# Patient Record
Sex: FEMALE | Race: WHITE
[De-identification: names, ages, dates, MRNs, and addresses within clinical notes are randomized per-mention and may not be internally consistent; named-entity substitution may affect disease eponyms.]

---

## 2018-02-23 ENCOUNTER — HOSPITAL ENCOUNTER (OUTPATIENT)
Dept: HOSPITAL 53 - M RAD | Age: 28
End: 2018-02-23
Attending: ADVANCED PRACTICE MIDWIFE
Payer: COMMERCIAL

## 2018-02-23 DIAGNOSIS — Z34.82: Primary | ICD-10-CM

## 2018-02-23 DIAGNOSIS — Z3A.21: ICD-10-CM

## 2018-02-23 PROCEDURE — 76811 OB US DETAILED SNGL FETUS: CPT

## 2019-03-01 ENCOUNTER — HOSPITAL ENCOUNTER (OUTPATIENT)
Dept: HOSPITAL 53 - M OPP | Age: 29
Discharge: HOME | End: 2019-03-01
Attending: INTERNAL MEDICINE
Payer: COMMERCIAL

## 2019-03-01 VITALS — WEIGHT: 95 LBS | HEIGHT: 64 IN | BODY MASS INDEX: 16.22 KG/M2

## 2019-03-01 VITALS — DIASTOLIC BLOOD PRESSURE: 55 MMHG | SYSTOLIC BLOOD PRESSURE: 106 MMHG

## 2019-03-01 DIAGNOSIS — K90.0: ICD-10-CM

## 2019-03-01 DIAGNOSIS — Z91.011: ICD-10-CM

## 2019-03-01 DIAGNOSIS — K64.8: Primary | ICD-10-CM

## 2019-03-01 DIAGNOSIS — K21.0: ICD-10-CM

## 2019-03-01 DIAGNOSIS — K22.8: ICD-10-CM

## 2019-03-01 DIAGNOSIS — Z91.018: ICD-10-CM

## 2019-03-01 DIAGNOSIS — K29.70: ICD-10-CM

## 2019-03-01 DIAGNOSIS — K51.90: ICD-10-CM

## 2019-03-01 PROCEDURE — 43239 EGD BIOPSY SINGLE/MULTIPLE: CPT

## 2019-03-01 PROCEDURE — 88305 TISSUE EXAM BY PATHOLOGIST: CPT

## 2019-03-01 PROCEDURE — 45380 COLONOSCOPY AND BIOPSY: CPT

## 2019-03-01 NOTE — ROOR
________________________________________________________________________________

Patient Name: Martha Batres             Procedure Date: 3/1/2019 11:57 AM

MRN: [id number]                          Account Number: [id number]

YOB: 1990                Age: 28

Room: [facility]                            Gender: Female

Note Status: Finalized                 

________________________________________________________________________________

 

Procedure:           Upper GI endoscopy

Indications:         Suspected celiac disease, Follow-up of celiac disease

Providers:           Glynn Mckinnon MD

Referring MD:        BRUNO XIE MD

Requesting Provider: 

Medicines:           Monitored Anesthesia Care

Complications:       No immediate complications.

________________________________________________________________________________

Procedure:           Pre-Anesthesia Assessment:

                     - Prior to the procedure, a History and Physical was 

                     performed, and patient medications and allergies were 

                     reviewed. The patient is competent. The risks and 

                     benefits of the procedure and the sedation options and 

                     risks were discussed with the patient. All questions were 

                     answered and informed consent was obtained. Patient 

                     identification and proposed procedure were verified by 

                     the physician, the nurse and the anesthesiologist in the 

                     procedure room. Mental Status Examination: alert and 

                     oriented. Airway Examination: normal oropharyngeal airway 

                     and neck mobility. Respiratory Examination: clear to 

                     auscultation. CV Examination: normal. Prophylactic 

                     Antibiotics: The patient does not require prophylactic 

                     antibiotics. Prior Anticoagulants: The patient has taken 

                     no previous anticoagulant or antiplatelet agents. ASA 

                     Grade Assessment: II - A patient with mild systemic 

                     disease. After reviewing the risks and benefits, the 

                     patient was deemed in satisfactory condition to undergo 

                     the procedure. The anesthesia plan was to use monitored 

                     anesthesia care (MAC). Immediately prior to 

                     administration of medications, the patient was 

                     re-assessed for adequacy to receive sedatives. The heart 

                     rate, respiratory rate, oxygen saturations, blood 

                     pressure, adequacy of pulmonary ventilation, and response 

                     to care were monitored throughout the procedure. The 

                     physical status of the patient was re-assessed after the 

                     procedure.

                     The Endoscope was introduced through the mouth, and 

                     advanced to the second part of duodenum. The upper GI 

                     endoscopy was accomplished without difficulty. The 

                     patient tolerated the procedure well.

                                                                                

Findings:

     LA Grade A (one or more mucosal breaks less than 5 mm, not extending 

     between tops of 2 mucosal folds) esophagitis with no bleeding was found 

     40 cm from the incisors. Biopsies were taken with a cold forceps for 

     histology. Verification of patient identification for the specimen was 

     done by the physician and nurse using the patient's name, birth date and 

     medical record number. Estimated blood loss was minimal.

     The Z-line was irregular and was found 40 cm from the incisors.

     Patchy mild inflammation characterized by erythema and granularity was 

     found in the gastric body and in the gastric antrum. Biopsies were taken 

     with a cold forceps for Helicobacter pylori testing.

     The duodenal bulb, second portion of the duodenum, third portion of the 

     duodenum and fourth portion of the duodenum were normal. Biopsies for 

     histology were taken with a cold forceps for evaluation of celiac disease.

                                                                                

Impression:          - LA Grade A reflux esophagitis. Biopsied.

                     - Z-line irregular, 40 cm from the incisors.

                     - Gastritis. Biopsied.

                     - Normal duodenal bulb, second portion of the duodenum, 

                     third portion of the duodenum and fourth portion of the 

                     duodenum. Biopsied.

Recommendation:      - Patient has a contact number available for emergencies. 

                     The signs and symptoms of potential delayed complications 

                     were discussed with the patient. Return to normal 

                     activities tomorrow. Written discharge instructions were 

                     provided to the patient.

                     - Resume previous diet.

                     - Continue present medications.

                     - Await pathology results.

                     - Based on the biopsy results you will receive a phone 

                     call from GI clinic in 2-3 weeks to review the pathology 

                     results AND/OR your results will be faxed to your Primary 

                     care physician.

                     - Return to primary care physician.

                                                                                

 

Glynn Mckinnon MD

_______________________

Glynn Mckinnon MD

3/1/2019 12:51:35 PM

This report has been signed electronically.

Number of Addenda: 0

 

Note Initiated On: 3/1/2019 11:57 AM

Estimated Blood Loss:

     Estimated blood loss was minimal.

## 2019-03-01 NOTE — ROOR
________________________________________________________________________________

Patient Name: Martha Batres             Procedure Date: 3/1/2019 11:58 AM

MRN: [id number]                          Account Number: [id number]

YOB: 1990                Age: 28

Room: [facility]                            Gender: Female

Note Status: Finalized                 

________________________________________________________________________________

 

Procedure:           Colonoscopy

Indications:         Follow-up of ulcerative colitis

Providers:           Glynn Mckinnon MD

Referring MD:        BRUNO XIE MD

Requesting Provider: 

Medicines:           Monitored Anesthesia Care

Complications:       No immediate complications.

________________________________________________________________________________

Procedure:           Pre-Anesthesia Assessment:

                     - Prior to the procedure, a History and Physical was 

                     performed, and patient medications and allergies were 

                     reviewed. The patient is competent. The risks and 

                     benefits of the procedure and the sedation options and 

                     risks were discussed with the patient. All questions were 

                     answered and informed consent was obtained. Patient 

                     identification and proposed procedure were verified by 

                     the physician, the nurse and the anesthesiologist in the 

                     procedure room. Mental Status Examination: alert and 

                     oriented. Airway Examination: normal oropharyngeal airway 

                     and neck mobility. Respiratory Examination: clear to 

                     auscultation. CV Examination: normal. Prophylactic 

                     Antibiotics: The patient does not require prophylactic 

                     antibiotics. Prior Anticoagulants: The patient has taken 

                     no previous anticoagulant or antiplatelet agents. ASA 

                     Grade Assessment: II - A patient with mild systemic 

                     disease. After reviewing the risks and benefits, the 

                     patient was deemed in satisfactory condition to undergo 

                     the procedure. The anesthesia plan was to use monitored 

                     anesthesia care (MAC). Immediately prior to 

                     administration of medications, the patient was 

                     re-assessed for adequacy to receive sedatives. The heart 

                     rate, respiratory rate, oxygen saturations, blood 

                     pressure, adequacy of pulmonary ventilation, and response 

                     to care were monitored throughout the procedure. The 

                     physical status of the patient was re-assessed after the 

                     procedure.

                     The Colonoscope was introduced through the anus and 

                     advanced to the terminal ileum, with identification of 

                     the appendiceal orifice and IC valve. The colonoscopy was 

                     performed without difficulty. The patient tolerated the 

                     procedure well. The quality of the bowel preparation was 

                     good. The terminal ileum, ileocecal valve, appendiceal 

                     orifice, and rectum were photographed. Scope insertion 

                     time was 3 minutes. Scope withdrawal time was 9 minutes. 

                     The total duration of the procedure was 12 minutes.

                                                                                

Findings:

     The perianal and digital rectal examinations were normal.

     The terminal ileum appeared normal.

     Normal mucosa was found in the entire colon. Biopsies for histology were 

     taken with a cold forceps from the right colon, left colon, sigmoid colon 

     and rectum for evaluation of microscopic colitis. Verification of patient 

     identification for the specimen was done by the physician and nurse using 

     the patient's name, birth date and medical record number. Estimated blood 

     loss was minimal.

     Non-bleeding external and internal hemorrhoids were found during 

     retroflexion. The hemorrhoids were medium-sized.

                                                                                

Impression:          - The examined portion of the ileum was normal.

                     - Normal mucosa in the entire examined colon. Biopsied.

                     - Non-bleeding external and internal hemorrhoids.

Recommendation:      - Patient has a contact number available for emergencies. 

                     The signs and symptoms of potential delayed complications 

                     were discussed with the patient. Return to normal 

                     activities tomorrow. Written discharge instructions were 

                     provided to the patient.

                     - Resume previous diet.

                     - Continue present medications.

                     - Await pathology results.

                     - Repeat colonoscopy at age 49 for screening purposes.

                     - Return to GI clinic in [facility] 

                     (address [address], [address], [place] [address]) 

                     in 4 -- 6 weeks. Please call GI clinic @ [phone] for 

                     apppointment date and time.

                     - Return to primary care physician.

                                                                                

 

Glynn Mckinnon MD

_______________________

Glynn Mckinnon MD

3/1/2019 1:27:09 PM

This report has been signed electronically.

Number of Addenda: 0

 

Note Initiated On: 3/1/2019 11:58 AM

Estimated Blood Loss:

     Estimated blood loss was minimal.

## 2019-05-22 ENCOUNTER — HOSPITAL ENCOUNTER (OUTPATIENT)
Dept: HOSPITAL 53 - M RAD | Age: 29
End: 2019-05-22
Attending: ADVANCED PRACTICE MIDWIFE
Payer: COMMERCIAL

## 2019-05-22 DIAGNOSIS — N64.4: Primary | ICD-10-CM

## 2019-05-22 NOTE — REP
ULTRASOUND RIGHT BREAST:

 

Real-time sonographic evaluation of the right breast performed for small palpable

abnormality and pain between 10 and 11 o'clock.  Ultrasound of that area shows

dense fibroglandular tissue without a discrete cystic or solid nodule.

 

IMPRESSION:

 

ACR 2 benign.  Dense fibroglandular tissue seen in the 9 to 11 o'clock region of

the right breast.  No discrete cystic or solid nodule.  A negative ultrasound

should not deter biopsy if there is a clinically suspicious palpable mass

present.

 

 

Electronically Signed by

William Shea MD 05/23/2019 12:26 P

## 2019-08-07 ENCOUNTER — HOSPITAL ENCOUNTER (OUTPATIENT)
Dept: HOSPITAL 53 - M SMT | Age: 29
End: 2019-08-07
Attending: ADVANCED PRACTICE MIDWIFE
Payer: COMMERCIAL

## 2019-08-07 DIAGNOSIS — N92.6: Primary | ICD-10-CM

## 2019-08-07 LAB — B-HCG SERPL QL: NEGATIVE

## 2019-08-12 ENCOUNTER — HOSPITAL ENCOUNTER (OUTPATIENT)
Dept: HOSPITAL 53 - M RAD | Age: 29
End: 2019-08-12
Attending: ADVANCED PRACTICE MIDWIFE
Payer: COMMERCIAL

## 2019-08-12 DIAGNOSIS — N83.202: Primary | ICD-10-CM

## 2019-08-12 NOTE — REP
REASON:  Metrorrhagia.

 

PRIORS:  None.

 

Transvesical and transvaginal imaging was obtained.

 

The uterus measures 9.1 x 5 x 6.3 cm. The parenchymal echo pattern is within

normal limits.  The endometrial echo complex is unremarkable appearing measuring

4 mm in its greatest thickness.

 

Right ovary measures 2.7 x 2.2 x 2.8 cm and is within normal limits with an RI of

0.36.  Left ovary measures 5.8 x 3.3 x 4.1 cm.  Within the substance of the left

ovary there is a 2.7 x 2.4 x 2.6 cm sized anechoic structure which exhibits

posterior wall enhancement and increased through transmission. This is without

papillary projections or septations and is consistent with a simple cyst.  The

left ovarian RI is 0.51.

 

Urinary bladder measures 11 x 9 x 11 cm.

 

IMPRESSION:

 

Simple left ovarian cyst as described above.

 

 

Electronically Signed by

Judd Garber DO 08/12/2019 04:48 P

## 2019-08-23 ENCOUNTER — HOSPITAL ENCOUNTER (OUTPATIENT)
Dept: HOSPITAL 53 - M WHC | Age: 29
End: 2019-08-23
Attending: FAMILY MEDICINE
Payer: COMMERCIAL

## 2019-08-23 DIAGNOSIS — M85.80: Primary | ICD-10-CM

## 2019-08-28 NOTE — DEXA
AP SPINE   L1 - L4   0.987   -1.7      -1.7

LT FEMUR   TOTAL   0.777   -1.8      -1.8

LT NECK      0.806   -1.7      -1.5

RT FEMUR   TOTAL   0.783   -1.8      -1.7      

RT NECK      0.761   -2.0      -1.8

TOTAL BODY   TOTAL

OTHER



COMMENTS:

There is low bone density of the spine and hips.

The density of the spine has decreased 6.0% since 02/25/2015.

The density of the left hip has decreased 2.9% since 02/27/2015.

The density of the right hip has decreased 0.6% since 02/27/2015.

The decreased density of the spine does represent a significant change.

The decreased density of the left hip does represent a significant change.

The decreased density of the right hip does not represent a significant change.



FOLLOW-UP:

Recommendation for the next bone density exam: 2 years.



RICARDO

## 2019-11-26 ENCOUNTER — HOSPITAL ENCOUNTER (OUTPATIENT)
Dept: HOSPITAL 53 - M RAD | Age: 29
End: 2019-11-26
Attending: OTOLARYNGOLOGY
Payer: COMMERCIAL

## 2019-11-26 DIAGNOSIS — J01.01: Primary | ICD-10-CM

## 2019-11-26 DIAGNOSIS — J34.89: ICD-10-CM

## 2019-11-27 NOTE — REP
MAXILLOFACIAL CT STUDY WITHOUT CONTRAST:

 

HISTORY:  Sinusitis.

 

No comparison imaging.

 

FINDINGS:  Frontal sinuses are clear.  There is moderate opacification of the

anterior mid ethmoid air cells on the right.  Minimal mucosal thickening is seen

in the ethmoid air cells on the left.  Sphenoid sinus is clear.  Mastoid aeration

is normal and symmetric.  There is minimal mucosal thickening affecting the left

maxillary sinus with the small mucous retention cyst along its superior wall.

There is moderate mucosal thickening surrounding the right maxillary sinus.  The

ostiomeatal complex on the right is obscured and filled by mucosal thickening.

On the left the OMC is patent.  There are Autumn cells bilaterally.  These are

small.

 

The bony nasal septum is in the midline.  Nasal turbinate soft tissues are

unremarkable. No intraorbital or intracranial abnormality is seen.

 

IMPRESSION:

 

Polysinusitis changes.  The right OMC is occluded by mucosal thickening.

Bilateral Autumn cells are seen.  The right maxillary and right ethmoid air cells

are most prominently affected.

 

 

Electronically Signed by

Dustin Puente MD 11/27/2019 11:17 A

## 2020-01-09 ENCOUNTER — HOSPITAL ENCOUNTER (OUTPATIENT)
Dept: HOSPITAL 53 - M WHC | Age: 30
End: 2020-01-09
Attending: ADVANCED PRACTICE MIDWIFE
Payer: COMMERCIAL

## 2020-01-09 DIAGNOSIS — R92.8: Primary | ICD-10-CM

## 2020-01-09 DIAGNOSIS — N64.4: ICD-10-CM

## 2020-01-09 NOTE — REP
Focused right breast sonography:

 

History:  Mastodynia right breast.  Area of the lump right axilla.

 

Findings:  In the area of the palpable lump, there is a 1.2 x 1.0 x 1.5 cm very

superficial subdermal mobile lymph node with an echogenic hilus and a benign

appearance.  Its long axis is parallel to the skin.  There are other lymph nodes

visible in the right axilla which also have a normal appearance.  These measure

as follows:  1.0 x 0.9 x 0.5 cm, 1.1 x 0.9 x 0.4 cm, and 0.9 x 0.6 x 0.2 cm.

 

Impression: BIRADS category 2 benign sonographic findings.  Clinical follow-up is

advised.

## 2020-01-31 ENCOUNTER — HOSPITAL ENCOUNTER (OUTPATIENT)
Dept: HOSPITAL 53 - M WHC | Age: 30
End: 2020-01-31
Attending: SURGERY
Payer: COMMERCIAL

## 2020-01-31 DIAGNOSIS — N63.11: Primary | ICD-10-CM

## 2020-02-03 NOTE — REP
Focused right breast/axillary ultrasound:

 

History:  Breast lump on right side at 10 o'clock position.  Comparison

sonography January 9, 2020.

 

Findings:  Scanning is performed in the area of the palpable lump in the right

axilla region.  There is an oval-shaped hypoechoic area with echogenic center

consistent with a normal lymph node.  Its dimensions today are 1.3 x 1.1 x 0.3

cm.  It is felt to be unchanged from the comparison sonogram.  No suspicious

features.

 

Impression:

 

BIRADS category 2 sonographic findings.

## 2020-02-12 ENCOUNTER — HOSPITAL ENCOUNTER (OUTPATIENT)
Dept: HOSPITAL 53 - M SDC | Age: 30
Discharge: HOME | End: 2020-02-12
Attending: OTOLARYNGOLOGY
Payer: COMMERCIAL

## 2020-02-12 VITALS — SYSTOLIC BLOOD PRESSURE: 116 MMHG | DIASTOLIC BLOOD PRESSURE: 55 MMHG

## 2020-02-12 VITALS — WEIGHT: 94 LBS | BODY MASS INDEX: 16.05 KG/M2 | HEIGHT: 64 IN

## 2020-02-12 DIAGNOSIS — K90.0: ICD-10-CM

## 2020-02-12 DIAGNOSIS — J32.9: Primary | ICD-10-CM

## 2020-02-12 DIAGNOSIS — F41.9: ICD-10-CM

## 2020-02-12 DIAGNOSIS — Z79.899: ICD-10-CM

## 2020-02-12 DIAGNOSIS — K51.90: ICD-10-CM

## 2020-02-12 DIAGNOSIS — K21.9: ICD-10-CM

## 2020-02-12 DIAGNOSIS — K90.41: ICD-10-CM

## 2020-02-12 DIAGNOSIS — F32.9: ICD-10-CM

## 2020-02-12 DIAGNOSIS — M81.0: ICD-10-CM

## 2020-02-12 PROCEDURE — 31267 ENDOSCOPY MAXILLARY SINUS: CPT

## 2020-02-12 PROCEDURE — 88305 TISSUE EXAM BY PATHOLOGIST: CPT

## 2020-02-12 PROCEDURE — 81025 URINE PREGNANCY TEST: CPT

## 2020-02-12 PROCEDURE — 31255 NSL/SINS NDSC W/TOT ETHMDCT: CPT

## 2020-03-03 ENCOUNTER — HOSPITAL ENCOUNTER (OUTPATIENT)
Dept: HOSPITAL 53 - M LAB REF | Age: 30
End: 2020-03-03
Attending: PHYSICIAN ASSISTANT
Payer: COMMERCIAL

## 2020-03-03 DIAGNOSIS — J01.90: Primary | ICD-10-CM

## 2020-11-30 ENCOUNTER — HOSPITAL ENCOUNTER (OUTPATIENT)
Dept: HOSPITAL 53 - M IRPRO | Age: 30
End: 2020-11-30
Attending: SURGERY
Payer: COMMERCIAL

## 2020-11-30 VITALS — SYSTOLIC BLOOD PRESSURE: 104 MMHG | DIASTOLIC BLOOD PRESSURE: 60 MMHG

## 2020-11-30 DIAGNOSIS — Z79.899: ICD-10-CM

## 2020-11-30 DIAGNOSIS — R22.2: ICD-10-CM

## 2020-11-30 DIAGNOSIS — R89.8: Primary | ICD-10-CM

## 2020-12-01 NOTE — REP
INDICATION:

LT SCAPULA NODULE.



COMPARISON:

None.



TECHNIQUE:

The procedure was performed by [facility], under the direct

supervision of Dr. Puente.



The risks and benefits of the procedure were explained to the patient and an informed

consent was obtained both verbally and written.  Directly prior to the start of the

procedure a formal time-out was completed in the procedure room.



FINDINGS:

Using ultrasound guidance the left upper back lymph node was localized.  The skin was

prepped and draped in a sterile fashion. Five mL of buffered lidocaine was used as a

local anesthetic.  Using ultrasound guidance a 25 gauge needle was inserted and

advanced into the left upper back lymph node. Eight FNA specimens were obtained.  Four

specimens were sent to our lab here, and remaining 4 were sent out in RPMI solution,

for further testing



The patient tolerated the procedure well and there were no immediate complications.

After the appropriate amount of monitored convalescence the patient was discharged

from the department.



IMPRESSION:

1.  Ultrasound-guided left upper back lymph node FNA.



<Electronically signed by Mary Mas > 11/30/20 1719

<Electronically signed by Nikos Puente > 12/01/20 0809

## 2021-03-05 ENCOUNTER — HOSPITAL ENCOUNTER (OUTPATIENT)
Dept: HOSPITAL 53 - M LAB | Age: 31
End: 2021-03-05
Attending: INTERNAL MEDICINE
Payer: COMMERCIAL

## 2021-03-05 DIAGNOSIS — K51.911: Primary | ICD-10-CM

## 2021-03-05 LAB
CRP SERPL-MCNC: < 0.3 MG/DL (ref 0–0.3)
FERRITIN SERPL-MCNC: 10 NG/ML (ref 8–252)
IRON SATN MFR SERPL: 14.7 % (ref 13.2–45)
IRON SERPL-MCNC: 55 UG/DL (ref 50–170)
TIBC SERPL-MCNC: 373 UG/DL (ref 250–450)

## 2021-03-07 ENCOUNTER — HOSPITAL ENCOUNTER (OUTPATIENT)
Dept: HOSPITAL 53 - M LAB REF | Age: 31
End: 2021-03-07
Attending: INTERNAL MEDICINE
Payer: COMMERCIAL

## 2021-03-07 DIAGNOSIS — K51.911: Primary | ICD-10-CM

## 2021-03-09 LAB — C DIFF TOX GENS STL QL NAA+PROBE: (no result)

## 2021-03-17 LAB
CALPROTECTIN STL-MCNT: 28 UG/G (ref 0–120)
ELASTASE PANC STL-MCNT: >500 UG/G (ref 200–?)

## 2021-07-21 ENCOUNTER — HOSPITAL ENCOUNTER (OUTPATIENT)
Dept: HOSPITAL 53 - M RAD | Age: 31
End: 2021-07-21
Attending: PHYSICIAN ASSISTANT
Payer: COMMERCIAL

## 2021-07-21 DIAGNOSIS — R22.1: Primary | ICD-10-CM

## 2021-07-21 NOTE — REPVR
PROCEDURE INFORMATION: 

Exam: CT Maxillofacial Without Contrast, Sinus 

Exam date and time: 7/21/2021 2:31 PM 

Age: 31 years old 

Clinical indication: Mass, lump, or swelling; Other: Nasal; Additional info: 

Ost nasal pharnyx mass / lump swelling neck 



TECHNIQUE: 

Imaging protocol: CT Maxillofacial without contrast. Focus on the sinuses. 

Radiation optimization: All CT scans at this facility use at least one of these 

dose optimization techniques: automated exposure control; mA and/or kV 

adjustment per patient size (includes targeted exams where dose is matched to 

clinical indication); or iterative reconstruction. 



COMPARISON: 

CT Maxilofacial w/out contrast 11/26/2019 5:28 PM 



FINDINGS: 

The soft tissues of the neck were not included on this examination



Frontal sinuses: Normal. No air-fluid levels. 

Ethmoid air cells: Normal. No air-fluid levels. 

Sphenoid sinuses: Normal. No air-fluid levels. 

Maxillary sinuses: Mucosal thickening left maxillary sinus. Mild mucosal 

thickening both maxillary sinuses, left greater than right.  Postoperative 

changes related to bilateral maxillary antrostomy.

Nasal cavity/Septum: Unremarkable. 



Orbital cavity: Orbits are normal. Globes are unremarkable. 

Bones/joints: Persistent meitopic cranial suture, normal variant 

Soft tissues: Unremarkable. 



Dental: Impacted wisdom teeth lower right mandible and upper left maxilla. 



IMPRESSION: 

Minimal mucosal thickening noted in the maxillary sinuses bilaterally.  No 

acute findings.



Electronically signed by: Bianca Faulkner On 07/21/2021  15:27:20 PM

## 2021-08-10 ENCOUNTER — HOSPITAL ENCOUNTER (OUTPATIENT)
Dept: HOSPITAL 53 - M LAB | Age: 31
End: 2021-08-10
Attending: ALLERGY & IMMUNOLOGY
Payer: COMMERCIAL

## 2021-08-10 DIAGNOSIS — D84.9: Primary | ICD-10-CM

## 2021-08-10 LAB
BASOPHILS # BLD AUTO: 0.1 10^3/UL (ref 0–0.2)
BASOPHILS NFR BLD AUTO: 1.1 % (ref 0–1)
EOSINOPHIL # BLD AUTO: 0.1 10^3/UL (ref 0–0.5)
EOSINOPHIL NFR BLD AUTO: 2.3 % (ref 0–3)
ERYTHROCYTE [SEDIMENTATION RATE] IN BLOOD BY WESTERGREN METHOD: 6 MM/HR (ref 0–20)
HCT VFR BLD AUTO: 36.3 % (ref 36–47)
HGB BLD-MCNC: 11.8 G/DL (ref 12–15.5)
IGA SERPL-MCNC: 155 MG/DL (ref 70–400)
IGE SERPL-ACNC: 12.3 IU/ML (ref ?–100)
IGG SERPL-MCNC: 1220 MG/DL (ref 681–1648)
IGM SERPL-MCNC: 144 MG/DL (ref 40–230)
LYMPHOCYTES # BLD AUTO: 1.8 10^3/UL (ref 1.5–5)
LYMPHOCYTES NFR BLD AUTO: 31.6 % (ref 24–44)
MCH RBC QN AUTO: 29.1 PG (ref 27–33)
MCHC RBC AUTO-ENTMCNC: 32.5 G/DL (ref 32–36.5)
MCV RBC AUTO: 89.4 FL (ref 80–96)
MONOCYTES # BLD AUTO: 0.4 10^3/UL (ref 0–0.8)
MONOCYTES NFR BLD AUTO: 6.8 % (ref 2–8)
NEUTROPHILS # BLD AUTO: 3.3 10^3/UL (ref 1.5–8.5)
NEUTROPHILS NFR BLD AUTO: 58 % (ref 36–66)
PLATELET # BLD AUTO: 285 10^3/UL (ref 150–450)
RBC # BLD AUTO: 4.06 10^6/UL (ref 4–5.4)
WBC # BLD AUTO: 5.6 10^3/UL (ref 4–10)